# Patient Record
Sex: FEMALE | Race: WHITE | NOT HISPANIC OR LATINO | Employment: OTHER | URBAN - METROPOLITAN AREA
[De-identification: names, ages, dates, MRNs, and addresses within clinical notes are randomized per-mention and may not be internally consistent; named-entity substitution may affect disease eponyms.]

---

## 2020-02-06 ENCOUNTER — NEW PATIENT COMPREHENSIVE (OUTPATIENT)
Dept: URBAN - METROPOLITAN AREA CLINIC 46 | Facility: CLINIC | Age: 60
End: 2020-02-06

## 2020-02-06 DIAGNOSIS — H52.7: ICD-10-CM

## 2020-02-06 PROCEDURE — 92015 DETERMINE REFRACTIVE STATE: CPT

## 2020-02-06 PROCEDURE — 92004 COMPRE OPH EXAM NEW PT 1/>: CPT

## 2020-02-06 ASSESSMENT — VISUAL ACUITY
OD_SC: J1+
OS_SC: J1+
OS_SC: 20/400
OD_CC: J3
OS_CC: 20/50-1
OD_SC: 20/400
OS_CC: J3
OD_CC: 20/40

## 2020-02-06 ASSESSMENT — TONOMETRY
OS_IOP_MMHG: 18
OD_IOP_MMHG: 16

## 2021-03-25 NOTE — PATIENT DISCUSSION
PLAN: CE/IOL OS THEN OD, goal avery ou, p.o with SAS, no van needed. +Ocucoat ou. Candidate for all lens options. If AV (TMF ou). May need additional surgeries secondary to high astigmatism ou. If CV (Toric lens ou). Not a candidate for Imprimis secondary to Cipro allergy. **No Moxi injection** Pt wants AV TMF ou.

## 2021-05-04 NOTE — PATIENT DISCUSSION
PLAN: CE/IOL OS THEN OD, goal avery ou, p.o with SAS, no van needed. +Ocucoat ou. Candidate for all lens options. If AV (TMF ou). May need additional surgeries secondary to high astigmatism ou. If CV (Toric lens ou). Not a candidate for Imprimis secondary to Cipro allergy. **No Moxi injection** Pt wants AV TMF ou. no fever/no weight loss/no weight gain/no sweating/no chills no chills/no sweating/no anorexia/no weight loss/no fever/no weight gain

## 2021-09-23 NOTE — PATIENT DISCUSSION
PO with Dr. Moises Morgan to decided on YAG and then possible LASIK, Patient noticed improved distance and near vision with trial frame of todays .

## 2021-09-30 NOTE — PATIENT DISCUSSION
RECOMMEND AGGRESSIVE TX OF FLAQUITO W/ PF AT QID AND WARM COMPRESSES BID.  STRESSED TO PT NEAR VISION WILL NOT BE THE SAME AS PRIOR TO CE/IOL AND TO MAKE SURE TO HAVE SUFFICIENT LIGHTING WHEN READING.

## 2021-12-06 NOTE — PATIENT DISCUSSION
The patient is status post Yag laser Capsulotomy in the left eye. The vision shows some improvement.

## 2022-01-17 NOTE — PATIENT DISCUSSION
SCL improved vision distance and near. Would like to see how vision seems now that SCL has been removed. Will call if interested in LASIK.

## 2023-07-14 ENCOUNTER — COMPREHENSIVE EXAM (OUTPATIENT)
Dept: URBAN - METROPOLITAN AREA CLINIC 46 | Facility: CLINIC | Age: 63
End: 2023-07-14

## 2023-07-14 DIAGNOSIS — H04.123: ICD-10-CM

## 2023-07-14 DIAGNOSIS — H25.813: ICD-10-CM

## 2023-07-14 DIAGNOSIS — H52.7: ICD-10-CM

## 2023-07-14 PROCEDURE — 92004 COMPRE OPH EXAM NEW PT 1/>: CPT

## 2023-07-14 PROCEDURE — 92015 DETERMINE REFRACTIVE STATE: CPT

## 2023-07-14 ASSESSMENT — VISUAL ACUITY
OS_SC: J1
OS_CC: J12
OS_PH: 20/25
OS_CC: 20/30-1
OS_SC: 20/400
OD_SC: J1
OD_CC: J12
OD_PH: 20/30
OD_SC: CF 6FT
OD_CC: 20/40

## 2023-07-14 ASSESSMENT — TONOMETRY
OD_IOP_MMHG: 17
OS_IOP_MMHG: 17